# Patient Record
Sex: MALE | Race: WHITE | NOT HISPANIC OR LATINO | Employment: UNEMPLOYED | URBAN - METROPOLITAN AREA
[De-identification: names, ages, dates, MRNs, and addresses within clinical notes are randomized per-mention and may not be internally consistent; named-entity substitution may affect disease eponyms.]

---

## 2021-10-08 PROBLEM — S32.001A BURST FRACTURE OF LUMBAR VERTEBRA: Status: ACTIVE | Noted: 2021-10-08

## 2021-10-08 PROBLEM — S72.432A: Status: ACTIVE | Noted: 2021-10-08

## 2021-10-08 PROBLEM — S72.22XA CLOSED DISPLACED SUBTROCHANTERIC FRACTURE OF LEFT FEMUR: Status: ACTIVE | Noted: 2021-10-08

## 2021-10-08 PROBLEM — S72.432A: Status: RESOLVED | Noted: 2021-10-08 | Resolved: 2021-10-08
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
## 2021-10-08 PROBLEM — S72.302A: Status: ACTIVE | Noted: 2021-10-08

## 2021-10-10 PROBLEM — E83.39 HYPOPHOSPHATEMIA: Status: ACTIVE | Noted: 2021-10-10

## 2021-10-11 PROBLEM — R33.9 URINARY RETENTION: Status: ACTIVE | Noted: 2021-10-11

## 2021-10-12 PROBLEM — E83.39 HYPOPHOSPHATEMIA: Status: RESOLVED | Noted: 2021-10-10 | Resolved: 2021-10-12

## 2021-10-15 PROBLEM — S34.139A: Status: ACTIVE | Noted: 2021-10-15

## 2021-11-10 PROBLEM — M43.25 FUSION OF SPINE OF THORACOLUMBAR REGION: Status: ACTIVE | Noted: 2021-11-10

## 2022-03-30 PROBLEM — S72.362K: Status: ACTIVE | Noted: 2022-03-30

## 2022-05-10 ENCOUNTER — TELEPHONE (OUTPATIENT)
Dept: ORTHOPEDICS | Facility: CLINIC | Age: 32
End: 2022-05-10
Payer: COMMERCIAL

## 2022-05-10 ENCOUNTER — PATIENT MESSAGE (OUTPATIENT)
Dept: ORTHOPEDICS | Facility: CLINIC | Age: 32
End: 2022-05-10
Payer: COMMERCIAL

## 2022-05-10 DIAGNOSIS — M25.511 RIGHT SHOULDER PAIN, UNSPECIFIED CHRONICITY: Primary | ICD-10-CM

## 2022-05-10 NOTE — TELEPHONE ENCOUNTER
Scheduled appt for 2nd option on shoulder surgery. Stated to arrive 30 min early for xr. Pt will bring MRI disc and report prior to visit. Verified location w/ the pt. Understanding verbalized.     ----- Message from Zunilda Monore sent at 5/10/2022  1:50 PM CDT -----  Contact: Nav  Type:  Patient Returning Call    Who Called: Nav  Who Left Message for Patient:  Does the patient know what this is regarding?: Scheduling appt  Would the patient rather a call back or a response via [facility]? Call back  Best Call Back Number: [phone]  Additional Information:

## 2022-05-12 ENCOUNTER — OFFICE VISIT (OUTPATIENT)
Dept: ORTHOPEDICS | Facility: CLINIC | Age: 32
End: 2022-05-12
Payer: COMMERCIAL

## 2022-05-12 ENCOUNTER — HOSPITAL ENCOUNTER (OUTPATIENT)
Dept: RADIOLOGY | Facility: HOSPITAL | Age: 32
Discharge: HOME | End: 2022-05-12
Attending: ORTHOPAEDIC SURGERY
Payer: COMMERCIAL

## 2022-05-12 VITALS — HEIGHT: 72 IN | WEIGHT: 203 LBS | BODY MASS INDEX: 27.5 KG/M2

## 2022-05-12 DIAGNOSIS — G25.89 SCAPULAR DYSKINESIS: ICD-10-CM

## 2022-05-12 DIAGNOSIS — S43.431A LABRAL TEAR OF SHOULDER, RIGHT, INITIAL ENCOUNTER: Primary | ICD-10-CM

## 2022-05-12 DIAGNOSIS — M25.611 SHOULDER STIFFNESS, RIGHT: ICD-10-CM

## 2022-05-12 DIAGNOSIS — M25.511 RIGHT SHOULDER PAIN, UNSPECIFIED CHRONICITY: ICD-10-CM

## 2022-05-12 PROCEDURE — 3008F PR BODY MASS INDEX (BMI) DOCUMENTED: ICD-10-PCS | Mod: CPTII,S$GLB,, | Performed by: ORTHOPAEDIC SURGERY

## 2022-05-12 PROCEDURE — 1159F MED LIST DOCD IN RCRD: CPT | Mod: CPTII,S$GLB,, | Performed by: ORTHOPAEDIC SURGERY

## 2022-05-12 PROCEDURE — 73030 X-RAY EXAM OF SHOULDER: CPT | Mod: TC,RT

## 2022-05-12 PROCEDURE — 73030 XR SHOULDER COMPLETE 2 OR MORE VIEWS RIGHT: ICD-10-PCS | Mod: 26,RT,, | Performed by: RADIOLOGY

## 2022-05-12 PROCEDURE — 99999 PR PBB SHADOW E&M-EST. PATIENT-LVL III: ICD-10-PCS | Mod: PBBFAC,,, | Performed by: ORTHOPAEDIC SURGERY

## 2022-05-12 PROCEDURE — 99204 OFFICE O/P NEW MOD 45 MIN: CPT | Mod: S$GLB,,, | Performed by: ORTHOPAEDIC SURGERY

## 2022-05-12 PROCEDURE — 99999 PR PBB SHADOW E&M-EST. PATIENT-LVL III: CPT | Mod: PBBFAC,,, | Performed by: ORTHOPAEDIC SURGERY

## 2022-05-12 PROCEDURE — 1159F PR MEDICATION LIST DOCUMENTED IN MEDICAL RECORD: ICD-10-PCS | Mod: CPTII,S$GLB,, | Performed by: ORTHOPAEDIC SURGERY

## 2022-05-12 PROCEDURE — 3008F BODY MASS INDEX DOCD: CPT | Mod: CPTII,S$GLB,, | Performed by: ORTHOPAEDIC SURGERY

## 2022-05-12 PROCEDURE — 99204 PR OFFICE/OUTPT VISIT, NEW, LEVL IV, 45-59 MIN: ICD-10-PCS | Mod: S$GLB,,, | Performed by: ORTHOPAEDIC SURGERY

## 2022-05-12 PROCEDURE — 73030 X-RAY EXAM OF SHOULDER: CPT | Mod: 26,RT,, | Performed by: RADIOLOGY

## 2022-05-12 RX ORDER — DOXAZOSIN 4 MG/1
4 TABLET ORAL NIGHTLY
COMMUNITY
End: 2023-08-09

## 2022-05-12 NOTE — PROGRESS NOTES
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 His symptoms started on 1/13/2022. He was in a back brace for a fractured spine after falling 25 feet out of a tree, and when he was released from the brace, he noticed that his shoulder ROM was limited. He was previously seen by Dr. Bains in [place] in March of 2022. Treatment included x-ray and an MRI. Dr. Bains reccommended a SLAP repair. He also received a second opinion on 5/11/2022 Dr. Pina at [facility]. The SLAP repair was also reccommended. The patient presents to the clinic for a third opinion on the recommended SLAP repair surgery. He has been working with his Physical Therapist for this issue since mid-January.     HPI    Past Medical History:   Past Medical History:   Diagnosis Date    Anxiety     Depression     T12-L2 Lami/fusion/instrumentation 11/10/2021     Past Surgical History:   Procedure Laterality Date    INTRAMEDULLARY RODDING OF FEMUR Left 10/9/2021    Procedure: INSERTION, INTRAMEDULLARY MARÍA, FEMUR;  Surgeon: Gregorio Rosas MD;  Location: [facility] MAIN OR;  Service: Orthopedics;  Laterality: Left;    LUMBAR FUSION N/A 10/9/2021    Procedure: T12-L2 LUMBAR FUSION WITH POSSIBLE EXTENSION, DECOMPRESSION;  Surgeon: Sandoval Wilson MD;  Location: [facility] MAIN OR;  Service: Neurosurgery;  Laterality: N/A;    SOFT TISSUE MASS EXCISION      Biceps tendon     Family History   Problem Relation Age of Onset    Stroke Unknown     Heart disease Unknown     Cancer Unknown     Hypertension Unknown      Social History     Socioeconomic History    Marital  Pt requesting ear drops for fluid in her ears    SLIM messaged regarding pt's request  status:    Tobacco Use    Smoking status: Never Smoker    Smokeless tobacco: Current User     Types: Snuff   Substance and Sexual Activity    Alcohol use: Not Currently    Drug use: Not Currently    Sexual activity: Yes     Partners: Female   Social History Narrative    ** Merged History Encounter **          Medication List with Changes/Refills   Current Medications    ACETAMINOPHEN (TYLENOL) 500 MG TABLET    Take 2 tablets (1,000 mg total) by mouth every 6 (six) hours as needed for Pain.    AZITHROMYCIN (ZITHROMAX Z-GWENDOLYN) 250 MG TABLET    Take as directed    BISACODYL (DULCOLAX) 10 MG SUPP    Place 1 suppository (10 mg total) rectally daily as needed (constpation).    CIPROFLOXACIN HCL (CIPRO) 250 MG TABLET    Take 250 mg by mouth 2 (two) times daily.    ESCITALOPRAM OXALATE (LEXAPRO) 20 MG TABLET    Take 20 mg by mouth once daily.    FLUOXETINE 20 MG CAPSULE    Take 20 mg by mouth once daily.    FLURBIPROFEN (ANSAID) 100 MG TABLET    Take 1 tablet (100 mg total) by mouth 2 (two) times daily.    GABAPENTIN (NEURONTIN) 300 MG CAPSULE    Take 1 capsule (300 mg total) by mouth 3 (three) times daily.    HYOSCYAMINE 0.125 MG SUBL    Place 2 tablets (0.25 mg total) under the tongue every 4 (four) hours as needed (bladder spasm).    IBUPROFEN (ADVIL,MOTRIN) 800 MG TABLET    Take 1 tablet (800 mg total) by mouth every 6 (six) hours as needed for Pain.    IPRATROPIUM (ATROVENT) 0.06 % NASAL SPRAY    2 sprays by Nasal route 4 (four) times daily as needed for Rhinitis.    LIDOCAINE (LIDODERM) 5 %    Place 1 patch onto the skin once daily. Remove & Discard patch within 12 hours or as directed by MD    OXYCODONE (ROXICODONE) 5 MG IMMEDIATE RELEASE TABLET    Take 5 mg by mouth 4 (four) times daily as needed.    POLYETHYLENE GLYCOL (GLYCOLAX) 17 GRAM/DOSE POWDER    Take by mouth.    TAMSULOSIN (FLOMAX) 0.4 MG CAP    Take 1 capsule (0.4 mg total) by mouth once daily.     Review of patient's allergies indicates:  No  Known Allergies  ROS    Physical Exam:   There is no height or weight on file to calculate BMI.  There were no vitals filed for this visit.   GENERAL: Well appearing, appropriate for stated age, no acute distress.  CARDIOVASCULAR: Pulses regular by peripheral palpation.  PULMONARY: Respirations are even and non-labored.  NEURO: Awake, alert, and oriented x 3.  PSYCH: Mood & affect are appropriate.  HEENT: Head is normocephalic and atraumatic.  Ortho/SPM Exam  ***    Imaging:    X-ray Shoulder 2 or More Views Right  Narrative: EXAMINATION:  XR SHOULDER COMPLETE 2 OR MORE VIEWS RIGHT    CLINICAL HISTORY:  Pain in right shoulder    TECHNIQUE:  Two or three views of the right shoulder were performed.    COMPARISON:  None    FINDINGS:  No acute osseous or soft tissue abnormality.  Impression: As above    Electronically signed by: Rusty Simmons MD  Date:    05/12/2022  Time:    10:29    ***  Relevant imaging results reviewed and interpreted by me, discussed with the patient and / or family today. ***    Other Tests:     ***    There are no Patient Instructions on file for this visit.  Provider Note/Medical Decision Making: ***       I discussed worrisome and red flag signs and symptoms with the patient. The patient expressed understanding and agreed to alert me immediately or to go to the emergency room if they experience any of these.    Treatment plan was developed with input from the patient/family, and they expressed understanding and agreement with the plan. All questions were answered today.    Disclaimer: This note was prepared using a voice recognition system and is likely to have sound alike errors within the text.

## 2022-05-12 NOTE — PROGRESS NOTES
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 His symptoms started on 1/13/2022. He was in a back brace for a fractured spine after falling 25 feet out of a tree.  He suffered a broken femur, spine fracture, conus medullaris injury. When he was released from the back brace, he noticed that his shoulder ROM was limited. He was previously seen by Dr. Bains in [place] in March of 2022. Treatment included x-ray and an MRI. Dr. Bains recommended a labral repair. He also received a second opinion on 5/11/2022 Dr. Pina at [facility]. The labral repair was also recommended. The patient presents to the clinic for a third opinion on the recommended SLAP repair surgery. He has been working with his Physical Therapist for this issue since mid-January.  He has just recently recovered from his back and leg injuries well enough that he has been able to discontinue use of a cane and focus on shoulder therapy.    Past Medical History:   Past Medical History:   Diagnosis Date    Anxiety     Depression     T12-L2 Lami/fusion/instrumentation 11/10/2021     Past Surgical History:   Procedure Laterality Date    INTRAMEDULLARY RODDING OF FEMUR Left 10/9/2021    Procedure: INSERTION, INTRAMEDULLARY MARÍA, FEMUR;  Surgeon: Gregorio Rosas MD;  Location: [facility] MAIN OR;  Service: Orthopedics;  Laterality: Left;    LUMBAR FUSION N/A 10/9/2021    Procedure: T12-L2 LUMBAR FUSION WITH POSSIBLE EXTENSION, DECOMPRESSION;  Surgeon: Sandoval Wilson MD;  Location: [facility] MAIN OR;  Service: Neurosurgery;  Laterality: N/A;    SOFT TISSUE MASS EXCISION       Biceps tendon     Family History   Problem Relation Age of Onset    Stroke Unknown     Heart disease Unknown     Cancer Unknown     Hypertension Unknown      Social History     Socioeconomic History    Marital status:    Tobacco Use    Smoking status: Never Smoker    Smokeless tobacco: Current User     Types: Snuff   Substance and Sexual Activity    Alcohol use: Not Currently    Drug use: Not Currently    Sexual activity: Yes     Partners: Female   Social History Narrative    ** Merged History Encounter **          Medication List with Changes/Refills   Current Medications    ACETAMINOPHEN (TYLENOL) 500 MG TABLET    Take 2 tablets (1,000 mg total) by mouth every 6 (six) hours as needed for Pain.    AZITHROMYCIN (ZITHROMAX Z-GWENDOLYN) 250 MG TABLET    Take as directed    BISACODYL (DULCOLAX) 10 MG SUPP    Place 1 suppository (10 mg total) rectally daily as needed (constpation).    CIPROFLOXACIN HCL (CIPRO) 250 MG TABLET    Take 250 mg by mouth 2 (two) times daily.    DOXAZOSIN (CARDURA) 4 MG TABLET    Take 4 mg by mouth every evening.    ESCITALOPRAM OXALATE (LEXAPRO) 20 MG TABLET    Take 20 mg by mouth once daily.    FLUOXETINE 20 MG CAPSULE    Take 20 mg by mouth once daily.    FLURBIPROFEN (ANSAID) 100 MG TABLET    Take 1 tablet (100 mg total) by mouth 2 (two) times daily.    GABAPENTIN (NEURONTIN) 300 MG CAPSULE    Take 1 capsule (300 mg total) by mouth 3 (three) times daily.    HYOSCYAMINE 0.125 MG SUBL    Place 2 tablets (0.25 mg total) under the tongue every 4 (four) hours as needed (bladder spasm).    IBUPROFEN (ADVIL,MOTRIN) 800 MG TABLET    Take 1 tablet (800 mg total) by mouth every 6 (six) hours as needed for Pain.    IPRATROPIUM (ATROVENT) 0.06 % NASAL SPRAY    2 sprays by Nasal route 4 (four) times daily as needed for Rhinitis.    LIDOCAINE (LIDODERM) 5 %    Place 1 patch onto the skin once daily. Remove & Discard patch within 12 hours or as directed by MD    OXYCODONE (ROXICODONE) 5 MG  IMMEDIATE RELEASE TABLET    Take 5 mg by mouth 4 (four) times daily as needed.    POLYETHYLENE GLYCOL (GLYCOLAX) 17 GRAM/DOSE POWDER    Take by mouth.    TAMSULOSIN (FLOMAX) 0.4 MG CAP    Take 1 capsule (0.4 mg total) by mouth once daily.     Review of patient's allergies indicates:  No Known Allergies      Physical Exam:   Body mass index is 27.53 kg/m².  There were no vitals filed for this visit.   GENERAL: Well appearing, appropriate for stated age, no acute distress.  CARDIOVASCULAR: Pulses regular by peripheral palpation.  PULMONARY: Respirations are even and non-labored.  NEURO: Awake, alert, and oriented x 3.  PSYCH: Mood & affect are appropriate.  HEENT: Head is normocephalic and atraumatic.    Right Shoulder:  Inspection: Scapular winging  Palpation tenderness: Nontender  Range of motion: 90 active ROM flexion pain limited    110 passive flexion    95 deg abduction         75 deg External Rotation in Adduction         IR to Lumbar  Strength:  5/5 Abduction    5/5 External Rotation in Adduction    5/5 Internal Rotation   Stability: anterior apprehension test positive for pain only    Positive Load and Shift    Positive Luma Test for posteroinferior labral tear    Positive Jerk Test for posterior labral tear  Special Tests: Negative Mcgill-Alvaro    Negative Neer's    Negative Speed's    Positive Pain with AB/ER   N/V Exam:  Radial: Normal motor (EPL/thumbs up)              Normal sensory (dorsal hand)   Median: Normal motor (FPL/A-OK)      Normal sensory (thumb)   Ulnar:  Normal motor (Interossei/scissors-spread)     Normal sensory (5th finger)   LABC: Normal sensory (lateral forearm)   MABC: Normal sensory (medial forearm)   MC: Normal motor (elbow flexion)   Axillary: Normal motor/sensory (deltoid)  Normal radial and ulnar pulses, warm and well perfused with capillary refill < 2 sec    Imaging:    X-ray Shoulder 2 or More Views Right  Narrative: EXAMINATION:  XR SHOULDER COMPLETE 2 OR MORE VIEWS  RIGHT    CLINICAL HISTORY:  Pain in right shoulder    TECHNIQUE:  Two or three views of the right shoulder were performed.    COMPARISON:  None    FINDINGS:  No acute osseous or soft tissue abnormality.  Impression: As above    Electronically signed by: Rusty Simmons MD  Date:    05/12/2022  Time:    10:29    Relevant imaging results reviewed and interpreted by me, discussed with the patient and / or family today. I agree with findings stated above.       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    Labral tear of shoulder, right, initial encounter Yes    Shoulder stiffness, right     Scapular dyskinesis       Plan:   Continue physical therapy focusing on improving stiffness and scapular winging. We would like to optimize this as much as possible before considering labral repair.  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 We will monitor this.   If he completely recovers from this with physical therapy, he may not need labral repair surgery.  Our suspicion is that he will likely need surgery to fully recover.  We will make this decision within the next few months after he has focused on rehab for a good amount of time.    Follow-up: 6-8 weeks or sooner if there are any problems between now and then.    Thank you for choosing [facility] and Dr. Dallas Morillo for your orthopedic & sports medicine care. It is our goal to provide you with exceptional care that will help keep you healthy, active, and get you back in the game.    If you felt that you received exemplary care today, please consider leaving us feedback on [employer] at  [email or web address]     Please do not hesitate to reach out to us via email, phone, or MyChart with any questions, concerns, or feedback.    If you are experiencing pain/discomfort ,or have questions after 5pm and would like to be connected to the [facility] [place] on-call team, please call this number and specify which Sports Medicine provider is treating you: [phone]      Provider Note/Medical Decision Making:      I discussed worrisome and red flag signs and symptoms with the patient. The patient expressed understanding and agreed to alert me immediately or to go to the emergency room if they experience any of these.    Treatment plan was developed with input from the patient/family, and they expressed understanding and agreement with the plan. All questions were answered today.    Disclaimer: This note was prepared using a voice recognition system and is likely to have sound alike errors within the text.     I, Jameel Alegre, acted as a scribe for Dallas Morillo MD for the duration of this office visit.

## 2022-05-12 NOTE — PATIENT INSTRUCTIONS
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    Labral tear of shoulder, right, initial encounter Yes    Shoulder stiffness, right     Scapular dyskinesis       Plan:  Continue Physical Therapy with Bernie Romero in [place] focusing on improving stiffness and scapular winging. We would like to optimize this as much as possible before considering labral repair.  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 We will monitor this.  If he completely recovers from this with physical therapy, he may not need labral repair surgery.  Our suspicion is that he will likely need surgery to fully recover.  We will make this decision within the next few months after he has focused on rehab for a good amount of time.  We recommend that he discuss with his femur surgeon the possibility of having the distal screw removed    Follow-up: 8 weeks or sooner if there are any problems between now and then.    Thank you for choosing [facility] and Dr. Dallas Morillo for your orthopedic & sports medicine care. It is our goal to provide you with exceptional care that will help keep you healthy, active, and get you back in the game.    If you felt that you received exemplary care today, please consider leaving us feedback on [employer] at [email or web address].     Please do not hesitate to reach out to us via email, phone, or MyChart with any questions, concerns, or feedback.    If you are experiencing pain/discomfort ,or have questions after 5pm and would like to be connected to the [facility] [place] on-call team, please call this number and specify which Sports Medicine provider is treating you: [phone]

## 2022-05-13 ENCOUNTER — PATIENT MESSAGE (OUTPATIENT)
Dept: ORTHOPEDICS | Facility: CLINIC | Age: 32
End: 2022-05-13
Payer: COMMERCIAL

## 2022-07-07 ENCOUNTER — OFFICE VISIT (OUTPATIENT)
Dept: ORTHOPEDICS | Facility: CLINIC | Age: 32
End: 2022-07-07
Payer: COMMERCIAL

## 2022-07-07 VITALS — WEIGHT: 201 LBS | HEIGHT: 72 IN | BODY MASS INDEX: 27.22 KG/M2

## 2022-07-07 DIAGNOSIS — S43.432D TEAR OF LEFT GLENOID LABRUM, SUBSEQUENT ENCOUNTER: ICD-10-CM

## 2022-07-07 DIAGNOSIS — G25.89 SCAPULAR DYSKINESIS: Primary | ICD-10-CM

## 2022-07-07 PROCEDURE — 99214 OFFICE O/P EST MOD 30 MIN: CPT | Mod: S$GLB,,, | Performed by: ORTHOPAEDIC SURGERY

## 2022-07-07 PROCEDURE — 3008F PR BODY MASS INDEX (BMI) DOCUMENTED: ICD-10-PCS | Mod: CPTII,S$GLB,, | Performed by: ORTHOPAEDIC SURGERY

## 2022-07-07 PROCEDURE — 1159F PR MEDICATION LIST DOCUMENTED IN MEDICAL RECORD: ICD-10-PCS | Mod: CPTII,S$GLB,, | Performed by: ORTHOPAEDIC SURGERY

## 2022-07-07 PROCEDURE — 3044F PR MOST RECENT HEMOGLOBIN A1C LEVEL <7.0%: ICD-10-PCS | Mod: CPTII,S$GLB,, | Performed by: ORTHOPAEDIC SURGERY

## 2022-07-07 PROCEDURE — 1159F MED LIST DOCD IN RCRD: CPT | Mod: CPTII,S$GLB,, | Performed by: ORTHOPAEDIC SURGERY

## 2022-07-07 PROCEDURE — 99999 PR PBB SHADOW E&M-EST. PATIENT-LVL III: ICD-10-PCS | Mod: PBBFAC,,, | Performed by: ORTHOPAEDIC SURGERY

## 2022-07-07 PROCEDURE — 99999 PR PBB SHADOW E&M-EST. PATIENT-LVL III: CPT | Mod: PBBFAC,,, | Performed by: ORTHOPAEDIC SURGERY

## 2022-07-07 PROCEDURE — 3044F HG A1C LEVEL LT 7.0%: CPT | Mod: CPTII,S$GLB,, | Performed by: ORTHOPAEDIC SURGERY

## 2022-07-07 PROCEDURE — 99214 PR OFFICE/OUTPT VISIT, EST, LEVL IV, 30-39 MIN: ICD-10-PCS | Mod: S$GLB,,, | Performed by: ORTHOPAEDIC SURGERY

## 2022-07-07 PROCEDURE — 3008F BODY MASS INDEX DOCD: CPT | Mod: CPTII,S$GLB,, | Performed by: ORTHOPAEDIC SURGERY

## 2022-07-07 NOTE — PROGRESS NOTES
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 He reports however he is still having instability events and is unable to hold items heavier than a cup. He reports pain when he needs to bring his arm down or holding his arm out with an item. He is pleased with his improvement with his range of motion, but   He reports he has to undergo a knee surgery in August where they are going to remove a screw from a previous surgery. He is unsure of what his next steps are though at this time and if he should go ahead and get the surgery done now.     Recall from last visit:   Patient presents to the clinic today c/o 2/10 Right Shoulder pain. His symptoms started on 1/13/2022. He was in a back brace for a fractured spine after falling 25 feet out of a tree.  He suffered a broken femur, spine fracture, conus medullaris injury. When he was released from the back brace, he noticed that his shoulder ROM was limited. He was previously seen by Dr. Bains in [place] in March of 2022. Treatment included x-ray and an MRI. Dr. Bains recommended a labral repair. He also received a second opinion on 5/11/2022 Dr. Pina at [facility]. The labral repair was also recommended. The patient presents to the clinic for a third opinion on the recommended SLAP repair surgery. He has been working with his Physical Therapist for this issue since mid-January.  He has just recently recovered from his back and leg injuries well enough that he has been able to discontinue use of a cane and focus  on shoulder therapy.     HPI    Past Medical History:   Past Medical History:   Diagnosis Date    Anxiety     Depression     T12-L2 Lami/fusion/instrumentation 11/10/2021     Past Surgical History:   Procedure Laterality Date    INTRAMEDULLARY RODDING OF FEMUR Left 10/9/2021    Procedure: INSERTION, INTRAMEDULLARY MARÍA, FEMUR;  Surgeon: Gregorio Rosas MD;  Location: [facility] MAIN OR;  Service: Orthopedics;  Laterality: Left;    LUMBAR FUSION N/A 10/9/2021    Procedure: T12-L2 LUMBAR FUSION WITH POSSIBLE EXTENSION, DECOMPRESSION;  Surgeon: Sandoval Wilson MD;  Location: [facility] MAIN OR;  Service: Neurosurgery;  Laterality: N/A;    SOFT TISSUE MASS EXCISION      Biceps tendon     Family History   Problem Relation Age of Onset    Stroke Unknown     Heart disease Unknown     Cancer Unknown     Hypertension Unknown      Social History     Socioeconomic History    Marital status:    Tobacco Use    Smoking status: Never Smoker    Smokeless tobacco: Current User     Types: Snuff   Substance and Sexual Activity    Alcohol use: Not Currently    Drug use: Not Currently    Sexual activity: Yes     Partners: Female   Social History Narrative    ** Merged History Encounter **          Medication List with Changes/Refills   Current Medications    ACETAMINOPHEN (TYLENOL) 500 MG TABLET    Take 2 tablets (1,000 mg total) by mouth every 6 (six) hours as needed for Pain.    AZITHROMYCIN (ZITHROMAX Z-GWENDOLYN) 250 MG TABLET    Take as directed    BISACODYL (DULCOLAX) 10 MG SUPP    Place 1 suppository (10 mg total) rectally daily as needed (constpation).    CIPROFLOXACIN HCL (CIPRO) 250 MG TABLET    Take 250 mg by mouth 2 (two) times daily.    DOXAZOSIN (CARDURA) 4 MG TABLET    Take 4 mg by mouth every evening.    ESCITALOPRAM OXALATE (LEXAPRO) 20 MG TABLET    Take 20 mg by mouth once daily.    FLUOXETINE 20 MG CAPSULE    Take 20 mg by mouth once daily.    FLURBIPROFEN (ANSAID) 100 MG TABLET    Take 1 tablet (100 mg total)  by mouth 2 (two) times daily.    GABAPENTIN (NEURONTIN) 300 MG CAPSULE    Take 1 capsule (300 mg total) by mouth 3 (three) times daily.    HYOSCYAMINE 0.125 MG SUBL    Place 2 tablets (0.25 mg total) under the tongue every 4 (four) hours as needed (bladder spasm).    IBUPROFEN (ADVIL,MOTRIN) 800 MG TABLET    Take 1 tablet (800 mg total) by mouth every 6 (six) hours as needed for Pain.    IPRATROPIUM (ATROVENT) 0.06 % NASAL SPRAY    2 sprays by Nasal route 4 (four) times daily as needed for Rhinitis.    LIDOCAINE (LIDODERM) 5 %    Place 1 patch onto the skin once daily. Remove & Discard patch within 12 hours or as directed by MD    OXYCODONE (ROXICODONE) 5 MG IMMEDIATE RELEASE TABLET    Take 5 mg by mouth 4 (four) times daily as needed.    POLYETHYLENE GLYCOL (GLYCOLAX) 17 GRAM/DOSE POWDER    Take by mouth.    TAMSULOSIN (FLOMAX) 0.4 MG CAP    Take 1 capsule (0.4 mg total) by mouth once daily.     Review of patient's allergies indicates:  No Known Allergies  ROS    Physical Exam:   Body mass index is 27.26 kg/m².  There were no vitals filed for this visit.   GENERAL: Well appearing, appropriate for stated age, no acute distress.  CARDIOVASCULAR: Pulses regular by peripheral palpation.  PULMONARY: Respirations are even and non-labored.  NEURO: Awake, alert, and oriented x 3.  PSYCH: Mood & affect are appropriate.  HEENT: Head is normocephalic and atraumatic.  Ortho/SPM Exam  Right Shoulder:  Inspection: Scapular winging  Palpation tenderness: Nontender  Range of motion: 170 active ROM flexion                              180 passive flexion                              95 deg abduction                                   75 deg External Rotation in Adduction                                   IR to Lumbar  Strength:            5/5 Abduction                              5/5 External Rotation in Adduction                              5/5 Internal Rotation   Stability:            anterior apprehension test positive for pain  only                              Negative Load and Shift                              Positive Luma Test for posteroinferior labral tear                              Positive Jerk Test for posterior labral tear  Special Tests: Negative Mcgill-Alvaro                              Negative Neer's                              Negative Speed's                              Positive Pain with AB/ER           N/V Exam:        Radial: Normal motor (EPL/thumbs up)              Normal sensory (dorsal hand)                Median: Normal motor (FPL/A-OK)                                Normal sensory (thumb)                Ulnar:  Normal motor (Interossei/scissors-spread)                                           Normal sensory (5th finger)                LABC:  Normal sensory (lateral forearm)                MABC: Normal sensory (medial forearm)                MC: Normal motor (elbow flexion)                Axillary: Normal motor/sensory (deltoid)  Normal radial and ulnar pulses, warm and well perfused with capillary refill < 2 sec       Imaging:  XR Results:  Results for orders placed during the hospital encounter of 05/12/22    X-ray Shoulder 2 or More Views Right    Narrative  EXAMINATION:  XR SHOULDER COMPLETE 2 OR MORE VIEWS RIGHT    CLINICAL HISTORY:  Pain in right shoulder    TECHNIQUE:  Two or three views of the right shoulder were performed.    COMPARISON:  None    FINDINGS:  No acute osseous or soft tissue abnormality.    Impression  As above      Electronically signed by: Rusty Simmons MD  Date:    05/12/2022  Time:    10:29    MRI Results:  Media Information                  Relevant imaging results reviewed and interpreted by me, discussed with the patient and / or family today.     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    Scapular dyskinesis Yes    Tear of left glenoid labrum, subsequent encounter         Plan:   Continue Physical Therapy with Bernie Romero in [place] focusing on improving stiffness and scapular winging and strength. We would like to optimize this as much as possible before considering labral repair.  o We will continue PT over the next few months and discuss surgical intervention if he is not better.    Had a long discussion with the progress he has had with physical therapy, but with all the other issues we should continue to work to see if he can continue to make improvements. It is very encouraging the progress he has made at this time. If he completely recovers from this with physical therapy, he may not need labral repair surgery.     Follow-up: 3 months or sooner if there are any problems between now and then.    Thank you for choosing [facility] and Dr. Dallas Morillo for your orthopedic & sports medicine care. It is our goal to provide you with exceptional care that will help keep you healthy, active, and get you back in the game.    If you felt that you received exemplary care today, please consider leaving us feedback on Healthgrades at [email or web address].     Please do not hesitate to reach out to us via email, phone, or MyChart with any questions, concerns, or feedback.    If you are experiencing pain/discomfort ,or have questions after 5pm and would like to be connected to the [facility] on-call team, please call this number and specify which Sports Medicine provider is treating you: [phone]        Provider Note/Medical Decision Making:       I discussed worrisome and red flag signs and symptoms with the patient. The  patient expressed understanding and agreed to alert me immediately or to go to the emergency room if they experience any of these.    Treatment plan was developed with input from the patient/family, and they expressed understanding and agreement with the plan. All questions were answered today.    Disclaimer: This note was prepared using a voice recognition system and is likely to have sound alike errors within the text.     .I, Patrizia Everett, acted as a scribe for Dallas Morillo MD for the duration of this office visit.

## 2022-07-07 NOTE — PATIENT INSTRUCTIONS
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    Scapular dyskinesis Yes    Tear of left glenoid labrum, subsequent encounter         Plan:  Continue Physical Therapy with Bernie Romero in [place] focusing on improving stiffness and scapular winging and strength. We would like to optimize this as much as possible before considering labral repair.  We will continue PT over the next few months and discuss surgical intervention if he is not better.   Had a long discussion with the progress he has had with physical therapy, but with all the other issues we should continue to work to see if he can continue to make improvements. It is very encouraging the progress he has made at this time. If he completely recovers from this with physical therapy, he may not need labral repair surgery.     Follow-up: 3 months or sooner if there are any problems between now and then.    Thank you for choosing [facility] and Dr. Dallas Morillo for your orthopedic & sports medicine care. It is our goal to provide you with exceptional care that will help keep you healthy, active, and get you back in the game.    If you felt that you received exemplary care today, please consider leaving us feedback on Healthgrades at [email or web address].     Please do not hesitate to reach out to us via email, phone, or MyChart with any questions, concerns, or feedback.    If you are experiencing pain/discomfort ,or have questions after 5pm and would like to be connected to the [facility] on-call team, please call this number and specify which Sports Medicine provider is  treating you: [phone]

## 2022-08-29 PROBLEM — T84.84XA PAINFUL ORTHOPAEDIC HARDWARE: Status: ACTIVE | Noted: 2022-08-29

## 2022-10-10 ENCOUNTER — OFFICE VISIT (OUTPATIENT)
Dept: ORTHOPEDICS | Facility: CLINIC | Age: 32
End: 2022-10-10
Payer: COMMERCIAL

## 2022-10-10 VITALS — WEIGHT: 195.31 LBS | BODY MASS INDEX: 26.45 KG/M2 | HEIGHT: 72 IN

## 2022-10-10 DIAGNOSIS — S43.432D TEAR OF LEFT GLENOID LABRUM, SUBSEQUENT ENCOUNTER: ICD-10-CM

## 2022-10-10 DIAGNOSIS — G25.89 SCAPULAR DYSKINESIS: Primary | ICD-10-CM

## 2022-10-10 PROCEDURE — 3044F PR MOST RECENT HEMOGLOBIN A1C LEVEL <7.0%: ICD-10-PCS | Mod: CPTII,S$GLB,, | Performed by: ORTHOPAEDIC SURGERY

## 2022-10-10 PROCEDURE — 99213 OFFICE O/P EST LOW 20 MIN: CPT | Mod: S$GLB,,, | Performed by: ORTHOPAEDIC SURGERY

## 2022-10-10 PROCEDURE — 99999 PR PBB SHADOW E&M-EST. PATIENT-LVL III: ICD-10-PCS | Mod: PBBFAC,,, | Performed by: ORTHOPAEDIC SURGERY

## 2022-10-10 PROCEDURE — 99213 PR OFFICE/OUTPT VISIT, EST, LEVL III, 20-29 MIN: ICD-10-PCS | Mod: S$GLB,,, | Performed by: ORTHOPAEDIC SURGERY

## 2022-10-10 PROCEDURE — 1159F PR MEDICATION LIST DOCUMENTED IN MEDICAL RECORD: ICD-10-PCS | Mod: CPTII,S$GLB,, | Performed by: ORTHOPAEDIC SURGERY

## 2022-10-10 PROCEDURE — 3044F HG A1C LEVEL LT 7.0%: CPT | Mod: CPTII,S$GLB,, | Performed by: ORTHOPAEDIC SURGERY

## 2022-10-10 PROCEDURE — 1159F MED LIST DOCD IN RCRD: CPT | Mod: CPTII,S$GLB,, | Performed by: ORTHOPAEDIC SURGERY

## 2022-10-10 PROCEDURE — 99999 PR PBB SHADOW E&M-EST. PATIENT-LVL III: CPT | Mod: PBBFAC,,, | Performed by: ORTHOPAEDIC SURGERY

## 2022-10-10 RX ORDER — GABAPENTIN 600 MG/1
600 TABLET ORAL 3 TIMES DAILY
COMMUNITY
Start: 2022-08-12

## 2022-10-10 NOTE — PROGRESS NOTES
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 He rates his pain as a 0/10 today. He is prescribed and taking gabapentin and robaxin for his pain, but nothing for his shoulder. He is still going to PT with Bernie Romero in [place], of which he believes has been helping him significantly. He estimates he is about 70-80% healed since his initial pain started. He also reports that he has a tens unit from [employer] which was prescribed for his back, but he uses it for shoulder pain relief as well. He says that it is amazing with controlling his symptoms.    HPI  Plan (7/7/22):  Continue Physical Therapy with Bernie Romero in [place] focusing on improving stiffness and scapular winging and strength. We would like to optimize this as much as possible before considering labral repair.  We will continue PT over the next few months and discuss surgical intervention if he is not better.   Had a long discussion with the progress he has had with physical therapy, but with all the other issues we should continue to work to see if he can continue to make improvements. It is very encouraging the progress he has made at this time. If he completely recovers from this with physical therapy, he may not need labral repair surgery.   Past Medical History:   Past Medical History:   Diagnosis Date    Anxiety     Depression     T12-L2 Lami/fusion/instrumentation 11/10/2021     Past Surgical History:   Procedure Laterality Date    HARDWARE REMOVAL Left 8/29/2022    Procedure: REMOVAL, HARDWARE - Left distal femur (reviewed by  AD /25 [id number]) cmax bed  supine  HWR set;  Surgeon: Gregorio Rosas MD;  Location: [facility] MAIN OR;  Service: Orthopedics;  Laterality: Left;    INTRAMEDULLARY RODDING OF FEMUR Left 10/9/2021    Procedure: INSERTION, INTRAMEDULLARY MARÍA, FEMUR;  Surgeon: Gregorio Rosas MD;  Location: [facility] MAIN OR;  Service: Orthopedics;  Laterality: Left;    LUMBAR FUSION N/A 10/9/2021    Procedure: T12-L2 LUMBAR FUSION WITH POSSIBLE EXTENSION, DECOMPRESSION;  Surgeon: Sandoval Wilson MD;  Location: [facility] MAIN OR;  Service: Neurosurgery;  Laterality: N/A;    SOFT TISSUE MASS EXCISION      Biceps tendon     Family History   Problem Relation Age of Onset    Stroke Other     Heart disease Other     Cancer Other     Hypertension Other      Social History     Socioeconomic History    Marital status:    Tobacco Use    Smoking status: Never    Smokeless tobacco: Current     Types: Snuff   Substance and Sexual Activity    Alcohol use: Not Currently    Drug use: Not Currently    Sexual activity: Yes     Partners: Female   Social History Narrative    ** Merged History Encounter **          Medication List with Changes/Refills   Current Medications    CALCIUM CARBONATE (CALCIUM 300 ORAL)        CHOLECALCIFEROL, VITAMIN D3, 100 MCG (4,000 UNIT) CAP CAPSULE        DOXAZOSIN (CARDURA) 4 MG TABLET    Take 4 mg by mouth every evening.    FLUOXETINE 20 MG CAPSULE    Take 20 mg by mouth once daily.    GABAPENTIN (NEURONTIN) 600 MG TABLET    Take 600 mg by mouth 3 (three) times daily.    GLUCOSAMINE SULFATE 500 MG TAB        HYDROCODONE-ACETAMINOPHEN (NORCO)  MG PER TABLET    Take 1 tablet by mouth every 4 (four) hours as needed for Pain.    METHOCARBAMOL (ROBAXIN) 500 MG TAB    Take 1,500 mg by mouth 3 (three) times daily as needed.    MULTIVITAMIN WITH MINERALS (MEN'S ONE DAILY ORAL)        POLYETHYLENE GLYCOL (GLYCOLAX) 17 GRAM/DOSE POWDER    Take by mouth.   Discontinued Medications    ESCITALOPRAM OXALATE (LEXAPRO) 20 MG TABLET     Take 20 mg by mouth once daily.     Review of patient's allergies indicates:  No Known Allergies  ROS    Physical Exam:   Body mass index is 26.49 kg/m².  There were no vitals filed for this visit.   GENERAL: Well appearing, appropriate for stated age, no acute distress.  CARDIOVASCULAR: Pulses regular by peripheral palpation.  PULMONARY: Respirations are even and non-labored.  NEURO: Awake, alert, and oriented x 3.  PSYCH: Mood & affect are appropriate.  HEENT: Head is normocephalic and atraumatic.              Left Knee Exam     Inspection   Scars: present  Effusion: absent    Tenderness   The patient is experiencing no tenderness.     Range of Motion   Extension:  0   Flexion:  120     Other   Sensation: normalRight Shoulder Exam     Tenderness   Right shoulder tenderness location: no tenderness.    Range of Motion   Active abduction:  170   Forward Flexion:  180   External Rotation 0 degrees:  50   Internal rotation 0 degrees:  Mid thoracic     Other   Sensation: normal    Comments:  Intact extensor pollicis longus, flexor pollicis longus, finger flexion, finger extension, finger abduction and adduction. Sensation intact to radial, median, ulnar, and axillary nerve distributions. Hand warm and well perfused with capillary refill of less than 2 seconds, and palpable distal radial pulses.     Left Shoulder Exam     Range of Motion   Active abduction:  170   Forward Flexion:  180   External Rotation 0 degrees:  50   Internal rotation 0 degrees:  Mid thoracic        Muscle Strength   Right Upper Extremity   Shoulder Abduction: 5/5   Shoulder Internal Rotation: 5/5   Shoulder External Rotation: 5/5   Left Upper Extremity  Shoulder Abduction: 5/5   Shoulder Internal Rotation: 5/5   Shoulder External Rotation: 5/5   Left Lower Extremity   Hip Abduction: 5/5   Quadriceps:  5/5   Hamstrin/5     Vascular Exam     Right Pulses      Radial:                    2+      Left Pulses  Dorsalis Pedis:      2+  Posterior  Tibial:      2+        Imaging:    X-Ray Femur 2 View Left  EXAMINATION:  XR FEMUR 2 VIEW LEFT    CLINICAL HISTORY:  Pain due to internal orthopedic prosthetic devices, implants and grafts, initial encounter    TECHNIQUE:  XR FEMUR 2 VIEW LEFT    COMPARISON:  08/29/2022.    FINDINGS:  Fracture: Mid femoral shaft    Alignment: Unchanged    Fixation device: Intramedullary nail    Healing status: Healed    Soft tissues: Improved swelling.  Resolution of the soft tissue gas    The remaining findings are unchanged    Electronically signed by resident: Isabel Maxwell  Date:    09/15/2022  Time:    08:49    Electronically signed by: Sheldon Erazo  Date:    09/15/2022  Time:    09:06      Relevant imaging results reviewed and interpreted by me, discussed with the patient and / or family today.     Other Tests:         Patient Instructions   Assessment:  Morris Arora is a LHD 32 y.o. 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    Scapular dyskinesis Yes    Tear of left glenoid labrum, subsequent encounter       Plan:  Continue at home exercises provided by physical therapy   Follow up as needed     Follow-up: PRN or sooner if there are any problems between now and then.    Thank you for choosing [facility] and Dr. Dallas Morillo for your orthopedic & sports medicine care. It is our goal to provide you with exceptional care that will help keep you healthy, active, and get you back in the game.    If you felt that you received exemplary care today, please consider leaving us feedback on Healthgrades at [email or web address].     Please do not hesitate to reach out to us via email, phone, or MyChart with any questions, concerns, or  feedback.    If you are experiencing pain/discomfort ,or have questions after 5pm and would like to be connected to the [facility] on-call team, please call this number and specify which Sports Medicine provider is treating you: [phone]      Provider Note/Medical Decision Making:       I discussed worrisome and red flag signs and symptoms with the patient. The patient expressed understanding and agreed to alert me immediately or to go to the emergency room if they experience any of these.   Treatment plan was developed with input from the patient/family, and they expressed understanding and agreement with the plan. All questions were answered today.          Dallas Morillo MD  Orthopaedic Surgery & Sports Medicine       Disclaimer: This note was prepared using a voice recognition system and is likely to have sound alike errors within the text.       I, Patrizia Everett, acted as a scribe for Dallas Morillo MD for the duration of this office visit.

## 2022-10-10 NOTE — PROGRESS NOTES
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 He rates his pain as a 0/10 today. He is prescribed and taking gabapentin and robaxin for his pain, but nothing for his shoulder. He is still going to PT with Bernie Romero in [place], of which he believes has been helping him significantly. He estimates he is about 70-80% healed since his initial pain started. He also reports that he has a tens unit from [employer] which was prescribed for his back, but he uses it for shoulder pain relief as well. He says that it is amazing with controlling his symptoms.    HPI  Plan (7/7/22):  Continue Physical Therapy with Bernie Romero in [place] focusing on improving stiffness and scapular winging and strength. We would like to optimize this as much as possible before considering labral repair.  We will continue PT over the next few months and discuss surgical intervention if he is not better.   Had a long discussion with the progress he has had with physical therapy, but with all the other issues we should continue to work to see if he can continue to make improvements. It is very encouraging the progress he has made at this time. If he completely recovers from this with physical therapy, he may not need labral repair surgery.   Past Medical History:   Past Medical History:   Diagnosis Date    Anxiety     Depression     T12-L2 Lami/fusion/instrumentation 11/10/2021     Past Surgical History:   Procedure Laterality Date    HARDWARE REMOVAL Left 8/29/2022    Procedure: REMOVAL, HARDWARE - Left distal femur (reviewed  by AD /25 [id number]) cmax bed  supine  HWR set;  Surgeon: Gregorio Rosas MD;  Location: [facility] MAIN OR;  Service: Orthopedics;  Laterality: Left;    INTRAMEDULLARY RODDING OF FEMUR Left 10/9/2021    Procedure: INSERTION, INTRAMEDULLARY MARÍA, FEMUR;  Surgeon: Gregorio Rosas MD;  Location: [facility] MAIN OR;  Service: Orthopedics;  Laterality: Left;    LUMBAR FUSION N/A 10/9/2021    Procedure: T12-L2 LUMBAR FUSION WITH POSSIBLE EXTENSION, DECOMPRESSION;  Surgeon: Sandoval Wilson MD;  Location: [facility] MAIN OR;  Service: Neurosurgery;  Laterality: N/A;    SOFT TISSUE MASS EXCISION      Biceps tendon     Family History   Problem Relation Age of Onset    Stroke Other     Heart disease Other     Cancer Other     Hypertension Other      Social History     Socioeconomic History    Marital status:    Tobacco Use    Smoking status: Never    Smokeless tobacco: Current     Types: Snuff   Substance and Sexual Activity    Alcohol use: Not Currently    Drug use: Not Currently    Sexual activity: Yes     Partners: Female   Social History Narrative    ** Merged History Encounter **          Medication List with Changes/Refills   Current Medications    CALCIUM CARBONATE (CALCIUM 300 ORAL)        CHOLECALCIFEROL, VITAMIN D3, 100 MCG (4,000 UNIT) CAP CAPSULE        DOXAZOSIN (CARDURA) 4 MG TABLET    Take 4 mg by mouth every evening.    FLUOXETINE 20 MG CAPSULE    Take 20 mg by mouth once daily.    GABAPENTIN (NEURONTIN) 600 MG TABLET    Take 600 mg by mouth 3 (three) times daily.    GLUCOSAMINE SULFATE 500 MG TAB        HYDROCODONE-ACETAMINOPHEN (NORCO)  MG PER TABLET    Take 1 tablet by mouth every 4 (four) hours as needed for Pain.    METHOCARBAMOL (ROBAXIN) 500 MG TAB    Take 1,500 mg by mouth 3 (three) times daily as needed.    MULTIVITAMIN WITH MINERALS (MEN'S ONE DAILY ORAL)        POLYETHYLENE GLYCOL (GLYCOLAX) 17 GRAM/DOSE POWDER    Take by mouth.   Discontinued Medications    ESCITALOPRAM OXALATE (LEXAPRO) 20 MG TABLET     Take 20 mg by mouth once daily.     Review of patient's allergies indicates:  No Known Allergies  ROS    Physical Exam:   Body mass index is 26.49 kg/m².  There were no vitals filed for this visit.   GENERAL: Well appearing, appropriate for stated age, no acute distress.  CARDIOVASCULAR: Pulses regular by peripheral palpation.  PULMONARY: Respirations are even and non-labored.  NEURO: Awake, alert, and oriented x 3.  PSYCH: Mood & affect are appropriate.  HEENT: Head is normocephalic and atraumatic.  Ortho/SPM Exam  ***    Imaging:    X-Ray Femur 2 View Left  EXAMINATION:  XR FEMUR 2 VIEW LEFT    CLINICAL HISTORY:  Pain due to internal orthopedic prosthetic devices, implants and grafts, initial encounter    TECHNIQUE:  XR FEMUR 2 VIEW LEFT    COMPARISON:  08/29/2022.    FINDINGS:  Fracture: Mid femoral shaft    Alignment: Unchanged    Fixation device: Intramedullary nail    Healing status: Healed    Soft tissues: Improved swelling.  Resolution of the soft tissue gas    The remaining findings are unchanged    Electronically signed by resident: Isabel Maxwell  Date:    09/15/2022  Time:    08:49    Electronically signed by: Sheldon Erazo  Date:    09/15/2022  Time:    09:06    ***  Relevant imaging results reviewed and interpreted by me, discussed with the patient and / or family today. ***    Other Tests:     ***    There are no Patient Instructions on file for this visit.  Provider Note/Medical Decision Making: ***      I discussed worrisome and red flag signs and symptoms with the patient. The patient expressed understanding and agreed to alert me immediately or to go to the emergency room if they experience any of these.   Treatment plan was developed with input from the patient/family, and they expressed understanding and agreement with the plan. All questions were answered today.          Dallas Morillo MD  Orthopaedic Surgery & Sports Medicine       Disclaimer: This note was prepared using a voice  recognition system and is likely to have sound alike errors within the text.

## 2022-10-10 NOTE — PATIENT INSTRUCTIONS
Assessment:  Morris Arora is a LHD 32 y.o. 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    Scapular dyskinesis Yes    Tear of left glenoid labrum, subsequent encounter       Plan:  Continue at home exercises provided by physical therapy   Follow up as needed     Follow-up: PRN or sooner if there are any problems between now and then.    Thank you for choosing [facility] and Dr. Dallas Morillo for your orthopedic & sports medicine care. It is our goal to provide you with exceptional care that will help keep you healthy, active, and get you back in the game.    If you felt that you received exemplary care today, please consider leaving us feedback on [employer] at [email or web address].     Please do not hesitate to reach out to us via email, phone, or MyChart with any questions, concerns, or feedback.    If you are experiencing pain/discomfort ,or have questions after 5pm and would like to be connected to the [facility] on-call team, please call this number and specify which Sports Medicine provider is treating you: [phone]

## 2023-08-10 PROBLEM — M54.18 SACRAL RADICULOPATHY: Status: ACTIVE | Noted: 2023-08-10

## 2023-08-10 PROBLEM — K59.00 CONSTIPATION: Status: ACTIVE | Noted: 2023-08-10
